# Patient Record
Sex: FEMALE | Race: WHITE | NOT HISPANIC OR LATINO | Employment: STUDENT | URBAN - METROPOLITAN AREA
[De-identification: names, ages, dates, MRNs, and addresses within clinical notes are randomized per-mention and may not be internally consistent; named-entity substitution may affect disease eponyms.]

---
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
## 2024-03-17 DIAGNOSIS — Z30.09 FAMILY PLANNING: ICD-10-CM

## 2024-03-25 RX ORDER — NORGESTIMATE AND ETHINYL ESTRADIOL
1 KIT DAILY
Qty: 28 TABLET | Refills: 11 | Status: SHIPPED | OUTPATIENT
Start: 2024-03-25

## 2025-04-18 ENCOUNTER — OFFICE VISIT (OUTPATIENT)
Dept: INTERNAL MEDICINE | Facility: CLINIC | Age: 20
End: 2025-04-18
Payer: MEDICAID

## 2025-04-18 VITALS
HEIGHT: 66 IN | WEIGHT: 148 LBS | SYSTOLIC BLOOD PRESSURE: 98 MMHG | OXYGEN SATURATION: 98 % | HEART RATE: 83 BPM | BODY MASS INDEX: 23.78 KG/M2 | TEMPERATURE: 97.3 F | DIASTOLIC BLOOD PRESSURE: 60 MMHG

## 2025-04-18 DIAGNOSIS — Z30.8 ENCOUNTER FOR OTHER CONTRACEPTIVE MANAGEMENT: ICD-10-CM

## 2025-04-18 DIAGNOSIS — K58.2 IRRITABLE BOWEL SYNDROME WITH BOTH CONSTIPATION AND DIARRHEA: ICD-10-CM

## 2025-04-18 DIAGNOSIS — Z30.09 FAMILY PLANNING: ICD-10-CM

## 2025-04-18 DIAGNOSIS — Z76.89 ENCOUNTER TO ESTABLISH CARE: Primary | ICD-10-CM

## 2025-04-18 DIAGNOSIS — Z30.09 BIRTH CONTROL COUNSELING: ICD-10-CM

## 2025-04-18 RX ORDER — NORGESTIMATE AND ETHINYL ESTRADIOL 7DAYSX3 LO
1 KIT ORAL DAILY
Qty: 28 TABLET | Refills: 6 | Status: SHIPPED | OUTPATIENT
Start: 2025-04-18

## 2025-04-18 NOTE — PROGRESS NOTES
Chief Complaint  Establish Care (Last PCP with Owen )     Subjective:      History of Present Illness {CC  Problem List  Visit  Diagnosis   Encounters  Notes  Medications  Labs  Result Review Imaging  Media :23}     Mitra Graham presents to [facility] for:    Patient or patient representative verbalized consent for the use of Ambient Listening during the visit with  OSCAR Singh for chart documentation. 5/4/2025  15:18 EDT     History of Present Illness          The patient presents for a well-woman check.    The chief complaint is the need for a refill of birth control. She has been under the care of Dr. Weaver for her contraceptive needs, with her last consultation approximately 2 years ago. She has been adhering to her birth control regimen without any complications but has exhausted her supply this week. Her menstrual cycle concluded last week, and she is scheduled to resume her birth control on the upcoming Sunday. She is contemplating the use of an intrauterine device (IUD) during her forthcoming OB/GYN appointment on 05/29/2025, as she finds it more convenient than oral contraceptives. She has been utilizing birth control since the onset of her college education, spanning a period of 4 years.    No recent episodes of headaches are reported. However, intermittent irritable bowel symptoms continue, which are managed through dietary modifications, including the incorporation of prebiotics and probiotics. Fiber intake is adjusted based on gastrointestinal status.    GYNECOLOGICAL HISTORY:  - Last Menstrual Period: Last week     Past Medical History:   Diagnosis Date    Anemia     Asthma     Behavior concern     Dyspnea     Migraine headache     Seasonal allergies     Sports physical      Family History   Problem Relation Age of Onset    Diabetes Maternal Grandmother     Heart disease Maternal Grandmother      Social History     Tobacco Use   Smoking Status  Never   Smokeless Tobacco Never     Social History     Substance and Sexual Activity   Alcohol Use Yes         I have reviewed patient's medical history, any new submitted information provided by patient or medical assistant and updated medical record.      Objective:      Physical Exam  Vitals reviewed.   Constitutional:       General: She is awake. She is not in acute distress.     Appearance: Normal appearance. She is not ill-appearing, toxic-appearing or diaphoretic.   HENT:      Head: Normocephalic.      Right Ear: Hearing normal.      Left Ear: Hearing normal.      Nose: Nose normal.      Mouth/Throat:      Lips: Pink.      Mouth: Mucous membranes are moist.   Eyes:      General: Lids are normal. Vision grossly intact.      Conjunctiva/sclera: Conjunctivae normal.   Cardiovascular:      Rate and Rhythm: Normal rate and regular rhythm.      Heart sounds: Normal heart sounds.   Pulmonary:      Effort: Pulmonary effort is normal.      Breath sounds: Normal breath sounds.   Abdominal:      General: Bowel sounds are normal.   Skin:     General: Skin is warm and dry.      Capillary Refill: Capillary refill takes less than 2 seconds.   Neurological:      General: No focal deficit present.      Mental Status: She is alert and oriented to person, place, and time. Mental status is at baseline.   Psychiatric:         Attention and Perception: Attention and perception normal.         Mood and Affect: Mood and affect normal.         Speech: Speech normal.         Behavior: Behavior normal. Behavior is cooperative.         Cognition and Memory: Cognition and memory normal.         Judgment: Judgment normal.        Result Review  Data Reviewed:{ Labs  Result Review  Imaging  Med Tab  Media :23}     Results         The following data was reviewed by: OSCAR Singh on 04/18/2025                   Vital Signs:   BP 98/60 (BP Location: Left arm, Patient Position: Sitting, Cuff Size: Adult)   Pulse 83   Temp 97.3 °F  "(36.3 °C) (Temporal)   Ht 167.6 cm (66\")   Wt 67.1 kg (148 lb)   SpO2 98%   BMI 23.89 kg/m²     BMI is within normal parameters. No other follow-up for BMI required.             Requested Prescriptions     Signed Prescriptions Disp Refills    Norgestimate-Eth Estradiol (Tri-Lo-Alexsandra) 0.18/0.215/0.25 MG-25 MCG tablet 28 tablet 6     Sig: Take 1 tablet by mouth Daily.       Routine medications provided by this office will also be refilled via pharmacy request.       Current Outpatient Medications:     Norgestimate-Eth Estradiol (Tri-Lo-Alexsandra) 0.18/0.215/0.25 MG-25 MCG tablet, Take 1 tablet by mouth Daily., Disp: 28 tablet, Rfl: 6     Assessment and Plan:      Assessment and Plan {CC Problem List  Visit Diagnosis  ROS  Review (Popup)  Health Maintenance  Quality  BestPractice  Medications  SmartSets  SnapShot Encounters  Media :23}     Problem List Items Addressed This Visit       Family planning    Relevant Medications    Norgestimate-Eth Estradiol (Tri-Lo-Alexsandra) 0.18/0.215/0.25 MG-25 MCG tablet    Irritable bowel syndrome with both constipation and diarrhea     Other Visit Diagnoses         Encounter to establish care    -  Primary      Birth control counseling          Encounter for other contraceptive management                     1. Well-woman examination.  - Due for her annual physical examination.  - Comprehensive discussion regarding various types of intrauterine devices (IUDs), including potential benefits and drawbacks.  - Advised to receive tetanus vaccine at a local pharmacy or [facility].  - Prescription for birth control medication provided with 11 refills authorized; instructed to schedule annual physical examination in May 2025, during which necessary lab work will be conducted. Advised to undergo STD screening and Pap smear during OB/GYN appointment in May 2025.    2. Irritable bowel syndrome (IBS).  - Reports occasional flare-ups of IBS symptoms.  - Manages symptoms with dietary " modifications, including prebiotics and probiotics, which she finds helpful.  - No new medications prescribed for IBS at this time.       Follow Up {Instructions Charge Capture  Follow-up Communications :23}     Return in about 2 weeks (around 5/2/2025) for Annual physical.      Patient was given instructions and counseling regarding her condition or for health maintenance advice. Please see specific information pulled into the AVS if appropriate.        Dragon disclaimer:   Much of this encounter note is an electronic transcription/translation of spoken language to printed text. The electronic translation of spoken language may permit erroneous, or at times, nonsensical words or phrases to be inadvertently transcribed; Although I have reviewed the note for such errors, some may still exist.     Additional Patient Counseling:       There are no Patient Instructions on file for this visit.

## 2025-05-22 ENCOUNTER — OFFICE VISIT (OUTPATIENT)
Dept: INTERNAL MEDICINE | Facility: CLINIC | Age: 20
End: 2025-05-22
Payer: MEDICAID

## 2025-05-22 VITALS
TEMPERATURE: 97.3 F | SYSTOLIC BLOOD PRESSURE: 120 MMHG | HEIGHT: 66 IN | DIASTOLIC BLOOD PRESSURE: 68 MMHG | HEART RATE: 82 BPM | WEIGHT: 145 LBS | BODY MASS INDEX: 23.3 KG/M2 | OXYGEN SATURATION: 97 %

## 2025-05-22 DIAGNOSIS — J45.990 EXERCISE-INDUCED BRONCHOSPASM: Chronic | ICD-10-CM

## 2025-05-22 DIAGNOSIS — J45.22 MILD INTERMITTENT EXTRINSIC ASTHMA WITH STATUS ASTHMATICUS: ICD-10-CM

## 2025-05-22 DIAGNOSIS — Z13.29 SCREENING FOR THYROID DISORDER: ICD-10-CM

## 2025-05-22 DIAGNOSIS — Z00.00 ANNUAL PHYSICAL EXAM: Primary | ICD-10-CM

## 2025-05-22 DIAGNOSIS — Z13.1 SCREENING FOR DIABETES MELLITUS (DM): ICD-10-CM

## 2025-05-22 DIAGNOSIS — K58.2 IRRITABLE BOWEL SYNDROME WITH BOTH CONSTIPATION AND DIARRHEA: Chronic | ICD-10-CM

## 2025-05-22 DIAGNOSIS — Z13.6 SCREENING FOR CARDIOVASCULAR CONDITION: ICD-10-CM

## 2025-05-22 RX ORDER — ALBUTEROL SULFATE 90 UG/1
2 INHALANT RESPIRATORY (INHALATION) EVERY 4 HOURS PRN
Qty: 18 G | Refills: 2 | Status: SHIPPED | OUTPATIENT
Start: 2025-05-22

## 2025-05-22 NOTE — PROGRESS NOTES
Chief Complaint  Annual Exam     Subjective:      History of Present Illness {CC  Problem List  Visit  Diagnosis   Encounters  Notes  Medications  Labs  Result Review Imaging  Media :23}     Mitra Graham presents to [facility] for:    Patient or patient representative verbalized consent for the use of Ambient Listening during the visit with  OSCAR Singh for chart documentation. 5/22/2025  08:58 EDT     History of Present Illness          The patient presents for a physical exam.    She has an upcoming appointment with her gynecologist next Thursday. She is due for her tetanus vaccine, which she plans to receive at the pharmacy on the first floor. Her last dental visit was at the start of this year. She experienced a black eye in either 03/2025 or 04/2025, during which her vision was assessed and found to be excellent. She does not require corrective lenses. She maintains an active lifestyle, including regular walking and light weightlifting. She reports no symptoms of depression or anxiety. She has never undergone a mammogram or Pap smear. She is sexually active. She reports no shortness of breath, wheezing, or palpitations during exercise. She reports no presence of lumps, bumps, hand swelling, or edema.    She has a history of sports-induced asthma, which is weather-dependent. She has an inhaler but has not used it recently. She used to experience wheezing and asthma attacks while ice skating in cold conditions.    She manages her irritable bowel syndrome (IBS) through dietary control.    SOCIAL HISTORY  She is single. She is a non-smoker.    FAMILY HISTORY  Her mother has diabetes.     Mitra is here for coordination of medical care, to discuss health maintenance, disease prevention as well as to followup on medical problems.     Past Medical History:   Diagnosis Date    Anemia     Asthma     Behavior concern     Dyspnea     Migraine headache     Seasonal  allergies     Sports physical      Family History   Problem Relation Age of Onset    Diabetes Maternal Grandmother     Heart disease Maternal Grandmother      Social History     Tobacco Use   Smoking Status Never   Smokeless Tobacco Never     Social History     Substance and Sexual Activity   Alcohol Use Yes       Patient Care Team:  Taina Galvan APRN as PCP - General (Family Medicine)     Social:  Marital status: single. She feels safe at home  Employment:  sale rep just graduated  .    Dental visits: [facility] 2 times a year. This year   Dental health: good.   Brushes teeth 2 times a day, flosses occasionally.     Vision correction: not needed.   Hearing: normal.    Activity level is moderate.   Exercises 3 per week.     Weight trend is     Health and Weight:   Weight trend is   Wt Readings from Last 4 Encounters:   05/22/25 65.8 kg (145 lb)   04/18/25 67.1 kg (148 lb)   07/19/23 67.7 kg (149 lb 3.2 oz)   05/27/22 68.9 kg (152 lb) (83%, Z= 0.97)*     * Growth percentiles are based on CDC (Girls, 2-20 Years) data.       BMI is within normal parameters. No other follow-up for BMI required.      Mental Health Check:   Depression screen: PHQ-2 Total Score:    PHQ-9 Total Score:   0    Blood Pressure: (Goal BP is SBP less than 140 / DBP less than 90)  BP Readings from Last 3 Encounters:   05/22/25 120/68   04/18/25 98/60   07/19/23 107/71        Health Maintenance Female:  Current GYN provider: Dr ELLIOTT Archuleta  Last gynecology appointment: 05/29/25  Patient's last mammogram was never  Last Completed Mammogram    This patient has no relevant Health Maintenance data.        Advised routine self-breast exams monthly.  Sexually active: yes  Pap smears have been: Never  Dexa scan (65 for women and 70 for men with fractures) -N/A    Per ACOG Guidelines:  Age 21-29: screening every 3 years         30-65- pap and & HPV every 5 years         66 or older - three negative pap -done     - two negative HPV test in a row - done     -  two neg Co-Test in a row within past 10 years - done  For patients who have had a hysterectomy and have no history of cervical cancer or STEPHENIE our recommendations are based on type of hysterectomy:  ?Total hysterectomy (with cervix removed) - We recommend that patients who have undergone total hysterectomy and have no history of cervical cancer or STEPHENIE not undergo screening for cervical cancer or screening for vaginal cancer.  Patients who have undergone a hysterectomy in which the cervix was removed and have never had any screening abnormality are at a very small risk of cervical cancer. Although it was once believed that patients without a uterus were at increased risk for vaginal cancer, studies show no association between total hysterectomy for benign disease and subsequent vaginal carcinoma.  ?Subtotal hysterectomy (cervix intact) - Patients who have undergone subtotal hysterectomy and have no history of STEPHENIE likely share the same risk of cervical cancer as patients with an intact uterus and cervix, and screening recommendations should follow the guidelines for average-risk and higher risk patients described above.     STI Screen:   [] HIV     [] Syphilis   [] Chlamydia/ Gonorrhea   - If tests ordered, patient was informed HIV results will not show up on my chart.   [x] Declines STD screen    Vaccines Due:   [] Prevnar 20     [] Flu  [] Shingrix (shingles: series of 2)   [] TDap  [] COVID (booster)     [] RSV  [] HPV series       [x] Declines vaccines today    Health Risk Evaluation:  1. Cardiovascular risk factors: none.  2. Diabetes risk factors: none.   3. Cancer risk factors: no risk factors for cancer.  4. Hepatitis  C screen: [] Due  [x] Completed :   5. Dexa scan (65 for women and 70 for men with fractures)     The ASCVD Risk score (Anthony WOODWARD, et al., 2019) failed to calculate for the following reasons:    The 2019 ASCVD risk score is only valid for ages 40 to 79     Colon cancer screen:  (Screening starts  46 y/o)  She has no change in bowel habits.   Patient's last colonoscopy was None.   Last Completed Colonoscopy    This patient has no relevant Health Maintenance data.        She was advised to repeat in Patient declines due to age.  Family history Colon Cancer: [x] None    [] Yes:     Lung Cancer Screen: (Screening starts 50-81 y/o, if current or former smoke quit in past 15 years, 20 or more pack-years of smoking, continue to screen until age 80 or 15 years from quit date)   Low Dose CT scan is ordered as screening:   [x] Nonsmoker  [] History of smoking  [] Current    [] Hypertension   [] Hyperlipidemia  [] Diabetes    [] Obesity  [] Family history   [] Current or hx tobacco use  [] Sedentary lifestyle   [] Post-menopausal     Skin Cancer:   Regular Sunsceen: Advised  Routine self assessment of your skin, report any changes.   Always where sunglass when outside with UV protection      I have reviewed patient's medical history, any new submitted information provided by patient or medical assistant and updated medical record.      Objective:      Physical Exam  Vitals reviewed.   Constitutional:       General: She is awake. She is not in acute distress.     Appearance: Normal appearance. She is well-groomed. She is not ill-appearing, toxic-appearing or diaphoretic.   HENT:      Head: Normocephalic.      Right Ear: Hearing normal.      Left Ear: Hearing normal.      Nose: Nose normal.      Mouth/Throat:      Lips: Pink.      Mouth: Mucous membranes are moist.   Eyes:      General: Lids are normal. Vision grossly intact.         Right eye: No foreign body, discharge or hordeolum.         Left eye: No foreign body, discharge or hordeolum.      Extraocular Movements: Extraocular movements intact.      Conjunctiva/sclera: Conjunctivae normal.      Pupils: Pupils are equal, round, and reactive to light. Pupils are equal.   Neck:      Thyroid: No thyroid mass, thyromegaly or thyroid tenderness.      Vascular: No carotid  bruit or JVD.      Trachea: Trachea and phonation normal.   Cardiovascular:      Rate and Rhythm: Normal rate and regular rhythm.      Pulses: Normal pulses.      Heart sounds: Normal heart sounds, S1 normal and S2 normal. No murmur heard.     No friction rub. No gallop.   Pulmonary:      Effort: Pulmonary effort is normal. No respiratory distress.      Breath sounds: Normal breath sounds. No stridor, decreased air movement or transmitted upper airway sounds. No decreased breath sounds, wheezing, rhonchi or rales.   Chest:      Chest wall: No tenderness.   Abdominal:      General: Abdomen is flat. Bowel sounds are normal. There is no distension.      Palpations: Abdomen is soft. There is no hepatomegaly, splenomegaly, mass or pulsatile mass.      Tenderness: There is no abdominal tenderness. There is no right CVA tenderness, left CVA tenderness, guarding or rebound. Negative signs include Gonzalez's sign and Rovsing's sign.      Hernia: No hernia is present.   Musculoskeletal:         General: No swelling, tenderness, deformity or signs of injury. Normal range of motion.      Cervical back: Normal, full passive range of motion without pain and normal range of motion. No rigidity or tenderness.      Thoracic back: Normal.      Lumbar back: Normal.      Right lower leg: No edema.      Left lower leg: No edema.   Lymphadenopathy:      Head:      Right side of head: No submental, submandibular, tonsillar or preauricular adenopathy.      Left side of head: No submental, submandibular, tonsillar or preauricular adenopathy.      Cervical: No cervical adenopathy.      Right cervical: No superficial, deep or posterior cervical adenopathy.     Left cervical: No superficial, deep or posterior cervical adenopathy.      Upper Body:      Right upper body: No supraclavicular adenopathy.      Left upper body: No supraclavicular adenopathy.   Skin:     General: Skin is warm and dry.      Capillary Refill: Capillary refill takes less  "than 2 seconds.      Coloration: Skin is not jaundiced or pale.      Findings: No bruising, erythema, lesion or rash.   Neurological:      General: No focal deficit present.      Mental Status: She is alert and oriented to person, place, and time. Mental status is at baseline.      Motor: Motor function is intact. No weakness.      Coordination: Coordination is intact.      Gait: Gait is intact. Gait normal.   Psychiatric:         Attention and Perception: Attention and perception normal.         Mood and Affect: Mood and affect normal.         Speech: Speech normal.         Behavior: Behavior normal. Behavior is cooperative.         Thought Content: Thought content normal.         Cognition and Memory: Cognition and memory normal.         Judgment: Judgment normal.        Result Review  Data Reviewed:{ Labs  Result Review  Imaging  Med Tab  Media :23}     Results         The following data was reviewed by: OSCAR Singh on 05/22/2025             Vital Signs:   /68 (BP Location: Left arm, Patient Position: Sitting, Cuff Size: Adult)   Pulse 82   Temp 97.3 °F (36.3 °C) (Temporal)   Ht 167.6 cm (65.98\")   Wt 65.8 kg (145 lb)   SpO2 97%   BMI 23.41 kg/m²     BMI is within normal parameters. No other follow-up for BMI required.             Requested Prescriptions     Signed Prescriptions Disp Refills    albuterol sulfate  (90 Base) MCG/ACT inhaler 18 g 2     Sig: Inhale 2 puffs Every 4 (Four) Hours As Needed for Wheezing (exercise induced asthmna).       Routine medications provided by this office will also be refilled via pharmacy request.       Current Outpatient Medications:     Norgestimate-Eth Estradiol (Tri-Lo-Alexsandra) 0.18/0.215/0.25 MG-25 MCG tablet, Take 1 tablet by mouth Daily., Disp: 28 tablet, Rfl: 6    albuterol sulfate  (90 Base) MCG/ACT inhaler, Inhale 2 puffs Every 4 (Four) Hours As Needed for Wheezing (exercise induced asthmna)., Disp: 18 g, Rfl: 2    " Tetanus-Diphth-Acell Pertussis (Boostrix) 5-2.5-18.5 LF-MCG/0.5 injection, Inject 0.5 mL into the appropriate muscle as directed by prescriber., Disp: 0.5 mL, Rfl: 0     Assessment and Plan:      Assessment and Plan {CC Problem List  Visit Diagnosis  ROS  Review (Popup)  Health Maintenance  Quality  BestPractice  Medications  SmartSets  SnapShot Encounters  Media :23}     Problem List Items Addressed This Visit       Exercise-induced bronchospasm    Relevant Medications    albuterol sulfate  (90 Base) MCG/ACT inhaler    Irritable bowel syndrome with both constipation and diarrhea    Annual physical exam - Primary     Other Visit Diagnoses         Screening for thyroid disorder        Relevant Orders    TSH Rfx On Abnormal To Free T4      Screening for cardiovascular condition        Relevant Orders    Lipid Panel    Comprehensive Metabolic Panel    CBC & Differential    Microalbumin / Creatinine Urine Ratio - Urine, Clean Catch      Screening for diabetes mellitus (DM)        Relevant Orders    Hemoglobin A1c      Mild intermittent extrinsic asthma with status asthmaticus        Relevant Medications    albuterol sulfate  (90 Base) MCG/ACT inhaler                 1. Health maintenance.  - Due for a tetanus vaccine and Chlamydia screening, which will be conducted concurrently with her Pap smear.  - Comprehensive set of laboratory tests will be ordered today.  - Advised to receive her tetanus vaccine at the pharmacy located on the first floor of our facility.    2. Sports-induced asthma.  - History of sports-induced asthma, which can be exacerbated by cold weather.  - No recent use of an inhaler, but prefers to have one available.  - Albuterol inhaler with 2 refills will be prescribed for her to take to [place], especially given the cold climate there.  - Prescription will be sent to pharmacy, and substitutions will be allowed based on her insurance coverage.    3. Irritable bowel syndrome  (IBS).  - Manages IBS symptoms through dietary modifications.  - Symptoms are easier to manage at home compared to school.    4. General health.  - No recent changes in bowel habits, no shortness of breath, no wheezing, no palpitations, no hand swelling, and no edema.  - Regular physical activity includes walking and light lifting.  - No depression or anxiety issues reported.       Please complete your lab work today. Following review of results our office will be in contact with you for follow up care, medication changes or further instructions if needed. At that time if we need to schedule a follow up appointment one will be made at the time of the call.    Please review added information under the Patient Instructions portion of your print out.    Thank you for allowing us to care for you,  OSCAR Singh      Follow Up {Instructions Charge Capture  Follow-up Communications :23}     No follow-ups on file.      Patient was given instructions and counseling regarding her condition or for health maintenance advice. Please see specific information pulled into the AVS if appropriate.        Dragon disclaimer:   Much of this encounter note is an electronic transcription/translation of spoken language to printed text. The electronic translation of spoken language may permit erroneous, or at times, nonsensical words or phrases to be inadvertently transcribed; Although I have reviewed the note for such errors, some may still exist.     Additional Patient Counseling:       Patient Instructions   Preventive Care 21-39 Years Old, Female  Preventive care refers to lifestyle choices and visits with your health care provider that can promote health and wellness. Preventive care visits are also called wellness exams.  What can I expect for my preventive care visit?  Counseling  During your preventive care visit, your health care provider may ask about your:  Medical history, including:  Past medical problems.  Family  medical history.  Pregnancy history.  Current health, including:  Menstrual cycle.  Method of birth control.  Emotional well-being.  Home life and relationship well-being.  Sexual activity and sexual health.  Lifestyle, including:  Alcohol, nicotine or tobacco, and drug use.  Access to firearms.  Diet, exercise, and sleep habits.  Work and work environment.  Sunscreen use.  Safety issues such as seatbelt and bike helmet use.  Physical exam  Your health care provider may check your:  Height and weight. These may be used to calculate your BMI (body mass index). BMI is a measurement that tells if you are at a healthy weight.  Waist circumference. This measures the distance around your waistline. This measurement also tells if you are at a healthy weight and may help predict your risk of certain diseases, such as type 2 diabetes and high blood pressure.  Heart rate and blood pressure.  Body temperature.  Skin for abnormal spots.  What immunizations do I need?    Vaccines are usually given at various ages, according to a schedule. Your health care provider will recommend vaccines for you based on your age, medical history, and lifestyle or other factors, such as travel or where you work.  What tests do I need?  Screening  Your health care provider may recommend screening tests for certain conditions. This may include:  Pelvic exam and Pap test.  Lipid and cholesterol levels.  Diabetes screening. This is done by checking your blood sugar (glucose) after you have not eaten for a while (fasting).  Hepatitis B test.  Hepatitis C test.  HIV (human immunodeficiency virus) test.  STI (sexually transmitted infection) testing, if you are at risk.  BRCA-related cancer screening. This may be done if you have a family history of breast, ovarian, tubal, or peritoneal cancers.  Talk with your health care provider about your test results, treatment options, and if necessary, the need for more tests.  Follow these instructions at  home:  Eating and drinking    Eat a healthy diet that includes fresh fruits and vegetables, whole grains, lean protein, and low-fat dairy products.  Take vitamin and mineral supplements as recommended by your health care provider.  Do not drink alcohol if:  Your health care provider tells you not to drink.  You are pregnant, may be pregnant, or are planning to become pregnant.  If you drink alcohol:  Limit how much you have to 0-1 drink a day.  Know how much alcohol is in your drink. In the [place] one drink equals one 12 oz bottle of beer (355 mL), one 5 oz glass of wine (148 mL), or one 1½ oz glass of hard liquor (44 mL).  Lifestyle  Brush your teeth every morning and night with fluoride toothpaste. Floss one time each day.  Exercise for at least 30 minutes 5 or more days each week.  Do not use any products that contain nicotine or tobacco. These products include cigarettes, chewing tobacco, and vaping devices, such as e-cigarettes. If you need help quitting, ask your health care provider.  Do not use drugs.  If you are sexually active, practice safe sex. Use a condom or other form of protection to prevent STIs.  If you do not wish to become pregnant, use a form of birth control. If you plan to become pregnant, see your health care provider for a prepregnancy visit.  Find healthy ways to manage stress, such as:  Meditation, yoga, or listening to music.  Journaling.  Talking to a trusted person.  Spending time with friends and family.  Minimize exposure to UV radiation to reduce your risk of skin cancer.  Safety  Always wear your seat belt while driving or riding in a vehicle.  Do not drive:  If you have been drinking alcohol. Do not ride with someone who has been drinking.  If you have been using any mind-altering substances or drugs.  While texting.  When you are tired or distracted.  Wear a helmet and other protective equipment during sports activities.  If you have firearms in your house, make sure you follow all  gun safety procedures.  Seek help if you have been physically or sexually abused.  What's next?  Go to your health care provider once a year for an annual wellness visit.  Ask your health care provider how often you should have your eyes and teeth checked.  Stay up to date on all vaccines.  This information is not intended to replace advice given to you by your health care provider. Make sure you discuss any questions you have with your health care provider.  Document Revised: 06/15/2022 Document Reviewed: 06/15/2022  Elsevier Patient Education © 2024 [employer]

## 2025-05-23 ENCOUNTER — RESULTS FOLLOW-UP (OUTPATIENT)
Dept: INTERNAL MEDICINE | Facility: CLINIC | Age: 20
End: 2025-05-23
Payer: MEDICAID

## 2025-05-23 LAB
ALBUMIN SERPL-MCNC: 4.6 G/DL (ref 3.5–5.2)
ALBUMIN/CREAT UR: 3 MG/G CREAT (ref 0–29)
ALBUMIN/GLOB SERPL: 1.6 G/DL
ALP SERPL-CCNC: 56 U/L (ref 39–117)
ALT SERPL-CCNC: 9 U/L (ref 1–33)
AST SERPL-CCNC: 16 U/L (ref 1–32)
BASOPHILS # BLD AUTO: 0.03 10*3/MM3 (ref 0–0.2)
BASOPHILS NFR BLD AUTO: 0.4 % (ref 0–1.5)
BILIRUB SERPL-MCNC: 0.7 MG/DL (ref 0–1.2)
BUN SERPL-MCNC: 6 MG/DL (ref 6–20)
BUN/CREAT SERPL: 9.5 (ref 7–25)
CALCIUM SERPL-MCNC: 9.5 MG/DL (ref 8.6–10.5)
CHLORIDE SERPL-SCNC: 105 MMOL/L (ref 98–107)
CHOLEST SERPL-MCNC: 152 MG/DL (ref 0–200)
CO2 SERPL-SCNC: 22.9 MMOL/L (ref 22–29)
CREAT SERPL-MCNC: 0.63 MG/DL (ref 0.57–1)
CREAT UR-MCNC: 141.8 MG/DL
EGFRCR SERPLBLD CKD-EPI 2021: 128.8 ML/MIN/1.73
EOSINOPHIL # BLD AUTO: 0.06 10*3/MM3 (ref 0–0.4)
EOSINOPHIL NFR BLD AUTO: 0.8 % (ref 0.3–6.2)
ERYTHROCYTE [DISTWIDTH] IN BLOOD BY AUTOMATED COUNT: 11.5 % (ref 12.3–15.4)
GLOBULIN SER CALC-MCNC: 2.9 GM/DL
GLUCOSE SERPL-MCNC: 87 MG/DL (ref 65–99)
HBA1C MFR BLD: 5.3 % (ref 4.8–5.6)
HCT VFR BLD AUTO: 37.1 % (ref 34–46.6)
HDLC SERPL-MCNC: 59 MG/DL (ref 40–60)
HGB BLD-MCNC: 12.5 G/DL (ref 12–15.9)
IMM GRANULOCYTES # BLD AUTO: 0.02 10*3/MM3 (ref 0–0.05)
IMM GRANULOCYTES NFR BLD AUTO: 0.3 % (ref 0–0.5)
LDLC SERPL CALC-MCNC: 80 MG/DL (ref 0–100)
LYMPHOCYTES # BLD AUTO: 2.05 10*3/MM3 (ref 0.7–3.1)
LYMPHOCYTES NFR BLD AUTO: 27.2 % (ref 19.6–45.3)
MCH RBC QN AUTO: 31.9 PG (ref 26.6–33)
MCHC RBC AUTO-ENTMCNC: 33.7 G/DL (ref 31.5–35.7)
MCV RBC AUTO: 94.6 FL (ref 79–97)
MICROALBUMIN UR-MCNC: 4.8 UG/ML
MONOCYTES # BLD AUTO: 0.42 10*3/MM3 (ref 0.1–0.9)
MONOCYTES NFR BLD AUTO: 5.6 % (ref 5–12)
NEUTROPHILS # BLD AUTO: 4.96 10*3/MM3 (ref 1.7–7)
NEUTROPHILS NFR BLD AUTO: 65.7 % (ref 42.7–76)
NRBC BLD AUTO-RTO: 0 /100 WBC (ref 0–0.2)
PLATELET # BLD AUTO: 297 10*3/MM3 (ref 140–450)
POTASSIUM SERPL-SCNC: 4.6 MMOL/L (ref 3.5–5.2)
PROT SERPL-MCNC: 7.5 G/DL (ref 6–8.5)
RBC # BLD AUTO: 3.92 10*6/MM3 (ref 3.77–5.28)
SODIUM SERPL-SCNC: 138 MMOL/L (ref 136–145)
TRIGL SERPL-MCNC: 63 MG/DL (ref 0–150)
TSH SERPL DL<=0.005 MIU/L-ACNC: 1.14 UIU/ML (ref 0.27–4.2)
VLDLC SERPL CALC-MCNC: 13 MG/DL (ref 5–40)
WBC # BLD AUTO: 7.54 10*3/MM3 (ref 3.4–10.8)

## 2025-05-23 NOTE — TELEPHONE ENCOUNTER
"Called pt 211p to go over labs and vm full.     Hub to Relay     \"Sydnee reviewed your lab work.Your CMP shows stable liver and kidney function. Your CBC is stable; no evidence of anemia or infection. Your cholesterol is within normal limits. Your thyroid function is within normal range. Your diabetic A1C screening shows no evidence of diabetes at this time.   Please let us know if you have any further questions or concerns.\"  "

## 2025-05-23 NOTE — PROGRESS NOTES
Ms. Santos,  I reviewed your lab work.Your CMP shows stable liver and kidney function. Your CBC is stable; no evidence of anemia or infection. Your cholesterol is within normal limits. Your thyroid function is within normal range. Your diabetic A1C screening shows no evidence of diabetes at this time.   Please let us know if you have any further questions or concerns.  Thank you for allowing us to care for you,  OSCAR Singh

## 2025-05-29 ENCOUNTER — OFFICE VISIT (OUTPATIENT)
Dept: OBSTETRICS AND GYNECOLOGY | Age: 20
End: 2025-05-29
Payer: MEDICAID

## 2025-05-29 VITALS
SYSTOLIC BLOOD PRESSURE: 106 MMHG | WEIGHT: 147.2 LBS | DIASTOLIC BLOOD PRESSURE: 62 MMHG | BODY MASS INDEX: 23.66 KG/M2 | HEIGHT: 66 IN

## 2025-05-29 DIAGNOSIS — Z01.419 ENCOUNTER FOR ROUTINE GYNECOLOGICAL EXAMINATION WITH PAPANICOLAOU SMEAR OF CERVIX: Primary | ICD-10-CM

## 2025-05-29 DIAGNOSIS — Z30.09 ENCOUNTER FOR OTHER GENERAL COUNSELING OR ADVICE ON CONTRACEPTION: ICD-10-CM

## 2025-05-29 DIAGNOSIS — Z11.3 SCREENING EXAMINATION FOR STI: ICD-10-CM

## 2025-05-29 RX ORDER — PRENATAL VIT 91/IRON/FOLIC/DHA 28-975-200
1 COMBINATION PACKAGE (EA) ORAL NIGHTLY
COMMUNITY
Start: 2025-05-27

## 2025-05-29 NOTE — PROGRESS NOTES
[facility]  Obstetrics and Gynecology   Routine Annual Visit    2025    Patient: Mitra Graham          MR#:[id number]    History of Present Illness    Chief Complaint   Patient presents with    Gynecologic Exam     NGYN- Establish care, pt being seen for IUD consult, no previous pap smear     20 y.o. female  who presents for annual exam.    Patient is interesting in switching from combined OCPs which she has been on for the past 4 years to copper IUD.  She has no concerns about her birth control pills except that she has started forgetting them more frequently.  Prior to initiation of birth control pills, her.'s are monthly and she had no concerns about amount of bleeding or pain.  She is interested in trying a nonhormonal contraceptive method that is reliable.    She just graduated from [employer].  She is in a monogamous relationship with her boyfriend who lives in [place].  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    Obstetric History:  OB History          0    Para   0    Term   0       0    AB   0    Living   0         SAB   0    IAB   0    Ectopic   0    Molar   0    Multiple   0    Live Births   0               Menstrual History:     Patient's last menstrual period was 2025 (approximate).       Sexual History:   Monogamous sexual relationship with male    Concern for IPV: denies  Dyspareunia: denies  Breast concerns: denies  Fecal/urine incontinence: denies  Concern for STI?: denies - agreeable to screenineg    Current contraception: OCP (estrogen/progesterone)  History of abnormal Pap smear: has never had  Received Gardasil immunization:  yes - in adolescence   History of abnormal mammogram: n/a  Colon cancer screening: n/a  ________________________________________  Patient Active Problem List   Diagnosis    Family planning    Frequent headaches    Exercise-induced bronchospasm    Irritable bowel syndrome with both constipation and  "diarrhea    Annual physical exam     Past Medical History:   Diagnosis Date    Anemia     Dyspnea     Exercise-induced asthma     Migraine headache     Seasonal allergies      Past Surgical History:   Procedure Laterality Date    WISDOM TOOTH EXTRACTION  06/2024     Social History     Tobacco Use    Smoking status: Never    Smokeless tobacco: Never   Vaping Use    Vaping status: Never Used   Substance Use Topics    Alcohol use: Yes     Comment: socially    Drug use: Yes     Types: Marijuana     Family History   Problem Relation Age of Onset    Diabetes Maternal Grandmother     Heart disease Maternal Grandmother     Stroke Maternal Grandmother     Breast cancer Neg Hx     Uterine cancer Neg Hx     Ovarian cancer Neg Hx     Colon cancer Neg Hx     Pancreatic cancer Neg Hx     Prostate cancer Neg Hx      Prior to Admission medications    Medication Sig Start Date End Date Taking? Authorizing Provider   albuterol sulfate  (90 Base) MCG/ACT inhaler Inhale 2 puffs Every 4 (Four) Hours As Needed for Wheezing (exercise induced asthmna). 5/22/25  Yes Taina Galvan APRN   Norgestimate-Eth Estradiol (Tri-Lo-Alexsandra) 0.18/0.215/0.25 MG-25 MCG tablet Take 1 tablet by mouth Daily. 4/18/25  Yes Taina Galvan APRN   Tetanus-Diphth-Acell Pertussis (Boostrix) 5-2.5-18.5 LF-MCG/0.5 injection Inject 0.5 mL into the appropriate muscle as directed by prescriber.  Patient not taking: Reported on 5/29/2025 5/22/25        ________________________________________    Review of Systems   Genitourinary:  Negative for dyspareunia, menstrual problem and vaginal discharge.          Objective     /62   Ht 167.6 cm (65.98\")   Wt 66.8 kg (147 lb 3.2 oz)   LMP 04/28/2025 (Approximate)   BMI 23.77 kg/m²    BP Readings from Last 3 Encounters:   05/29/25 106/62   05/22/25 120/68   04/18/25 98/60      Wt Readings from Last 3 Encounters:   05/29/25 66.8 kg (147 lb 3.2 oz)   05/22/25 65.8 kg (145 lb)   04/18/25 67.1 kg (148 lb)    " "    BMI: Estimated body mass index is 23.77 kg/m² as calculated from the following:    Height as of this encounter: 167.6 cm (65.98\").    Weight as of this encounter: 66.8 kg (147 lb 3.2 oz).    Physical Exam  Vitals and nursing note reviewed.   Constitutional:       General: She is not in acute distress.     Appearance: Normal appearance.   HENT:      Head: Normocephalic and atraumatic.   Eyes:      Extraocular Movements: Extraocular movements intact.   Pulmonary:      Effort: Pulmonary effort is normal. No respiratory distress.   Chest:   Breasts:     Right: Normal. No mass, nipple discharge, skin change or tenderness.      Left: Normal. No mass, nipple discharge, skin change or tenderness.   Abdominal:      General: There is no distension.      Palpations: Abdomen is soft. There is no mass.      Tenderness: There is no abdominal tenderness.   Genitourinary:     General: Normal vulva.      Pubic Area: No rash.       Labia:         Right: No rash, lesion or injury.         Left: No rash, lesion or injury.       Urethra: No urethral lesion.      Vagina: Normal.      Cervix: Normal.      Uterus: Normal.       Adnexa: Right adnexa normal and left adnexa normal.      Rectum: Normal.   Lymphadenopathy:      Upper Body:      Right upper body: No supraclavicular or axillary adenopathy.      Left upper body: No supraclavicular or axillary adenopathy.   Skin:     General: Skin is warm and dry.   Neurological:      General: No focal deficit present.      Mental Status: She is alert.   Psychiatric:         Mood and Affect: Mood normal.         Behavior: Behavior normal.       Assessment:  Mitra Graham is a 20 y.o.  presenting for well woman exam.       Encounter for routine gynecological examination with Papanicolaou smear of cervix    Orders:    IGP, Rfx Aptima HPV ASCU    Screening examination for STI    Orders:    NuSwab VG+ - Swab, Vagina    Encounter for other general counseling or advice on contraception  Options " reviewed and desires ParaGard insertion.  Pain control options were discussed as well as expectations.         As part of wellness and prevention, the following topics were discussed with the patient:  Encouraged self breast awareness  Physical activity and regular exercised encouraged.   Healthy weight discussed.  Nutrition discussed.  Substance abuse/misuse discussed.  Sexual behavior/safe practices discussed.   Sexual transmitted disease prevention   Contraception discussed.   Mental health discussed.     Plan:  Return in about 2 weeks (around 6/12/2025) for paragard insertion.    Carla Becerril MD  5/29/2025 10:20 EDT

## 2025-06-01 LAB
A VAGINAE DNA VAG QL NAA+PROBE: ABNORMAL SCORE
BVAB2 DNA VAG QL NAA+PROBE: ABNORMAL SCORE
C ALBICANS DNA VAG QL NAA+PROBE: POSITIVE
C GLABRATA DNA VAG QL NAA+PROBE: NEGATIVE
C TRACH DNA SPEC QL NAA+PROBE: NEGATIVE
MEGA1 DNA VAG QL NAA+PROBE: ABNORMAL SCORE
N GONORRHOEA DNA VAG QL NAA+PROBE: NEGATIVE
T VAGINALIS DNA VAG QL NAA+PROBE: NEGATIVE

## 2025-06-02 LAB
CONV .: NORMAL
CYTOLOGIST CVX/VAG CYTO: NORMAL
CYTOLOGY CVX/VAG DOC CYTO: NORMAL
CYTOLOGY CVX/VAG DOC THIN PREP: NORMAL
DX ICD CODE: NORMAL
OTHER STN SPEC: NORMAL
SERVICE CMNT-IMP: NORMAL
STAT OF ADQ CVX/VAG CYTO-IMP: NORMAL

## 2025-06-06 ENCOUNTER — TELEPHONE (OUTPATIENT)
Dept: OBSTETRICS AND GYNECOLOGY | Age: 20
End: 2025-06-06
Payer: MEDICAID

## 2025-06-06 NOTE — TELEPHONE ENCOUNTER
Spoken with the patient and answered any questions she had regarding insertion. Advised her at appt if she absolutely is not able to stand insertion, her and Dr. Becerril can discuss having paraguard inserted under sedation.

## 2025-06-06 NOTE — TELEPHONE ENCOUNTER
Hub staff attempted to follow warm transfer process and was unsuccessful     Caller: Mitra Graham    Relationship to patient: Self    Best call back number: [phone]      Patient is needing: IS INQUIRING ABOUT SEDATION FOR IUD INSERTION FOR 06/12/25 APPOINTMENT. PATIENT STATES SHE IS SCARED AND WANTING TO KNOW MORE ABOUT SEDATION FOR THE PROCEDURE.     HUB ATTEMPT TO WARM TRANSFER TO CLINICAL BUT ADVISED TO SEND TE

## 2025-06-12 ENCOUNTER — OFFICE VISIT (OUTPATIENT)
Dept: OBSTETRICS AND GYNECOLOGY | Age: 20
End: 2025-06-12
Payer: MEDICAID

## 2025-06-12 VITALS
WEIGHT: 146.8 LBS | DIASTOLIC BLOOD PRESSURE: 60 MMHG | SYSTOLIC BLOOD PRESSURE: 116 MMHG | BODY MASS INDEX: 23.59 KG/M2 | HEIGHT: 66 IN

## 2025-06-12 DIAGNOSIS — Z30.430 ENCOUNTER FOR INSERTION OF PARAGARD IUD: ICD-10-CM

## 2025-06-12 DIAGNOSIS — Z01.818 PRE-OP TESTING: Primary | ICD-10-CM

## 2025-06-12 LAB
B-HCG UR QL: NEGATIVE
EXPIRATION DATE: NORMAL
INTERNAL NEGATIVE CONTROL: NORMAL
INTERNAL POSITIVE CONTROL: NORMAL
Lab: NORMAL

## 2025-06-12 RX ORDER — COPPER 313.4 MG/1
INTRAUTERINE DEVICE INTRAUTERINE ONCE
Status: COMPLETED | OUTPATIENT
Start: 2025-06-12 | End: 2025-06-12

## 2025-06-12 RX ORDER — TRETINOIN 0.025 %
1 CREAM (GRAM) TOPICAL NIGHTLY
COMMUNITY
Start: 2025-06-06

## 2025-06-12 RX ADMIN — COPPER: 313.4 INTRAUTERINE DEVICE INTRAUTERINE at 11:45

## 2025-06-12 NOTE — PROGRESS NOTES
IUD Insertion Procedure Note    Indication: Desires long acting reversible contraception     Procedure Details   Urine pregnancy test confirmed negative.  The risks (including infection, bleeding, pain, and uterine perforation) and benefits of the procedure were explained to the patient and verbal informed consent was obtained. Time out was performed. Chaperone present    Cervix was cleansed with Betadine. Allis clamp applied to anterior cervix.  Uterus sounded to 6 cm. IUD inserted without difficulty. Strings trimmed to 2-3 cm.    IUD Information:  ParaGard, Lot # [id number], Expiration date Aug 2027.    Condition:  Stable    Complications:  None, patient tolerated the procedure well    Plan:  The patient was advised to call for fever, prolonged or severe pain, or persistent bleeding. She was advised to use NSAIDs as needed for mild to moderate pain. Discussed expectation of irregular bleeding for 3-6 months but then generally resolves.  Follow up 6 weeks for string check.    Procedure time: 7 minutes    Carla Becerril MD  06/12/25  11:38 EDT

## 2025-08-21 ENCOUNTER — OFFICE VISIT (OUTPATIENT)
Dept: OBSTETRICS AND GYNECOLOGY | Age: 20
End: 2025-08-21
Payer: MEDICAID

## 2025-08-21 VITALS
WEIGHT: 147.6 LBS | BODY MASS INDEX: 23.72 KG/M2 | SYSTOLIC BLOOD PRESSURE: 108 MMHG | DIASTOLIC BLOOD PRESSURE: 64 MMHG | HEIGHT: 66 IN

## 2025-08-21 DIAGNOSIS — Z30.431 IUD CHECK UP: Primary | ICD-10-CM
